# Patient Record
Sex: FEMALE | Race: WHITE
[De-identification: names, ages, dates, MRNs, and addresses within clinical notes are randomized per-mention and may not be internally consistent; named-entity substitution may affect disease eponyms.]

---

## 2017-12-19 ENCOUNTER — HOSPITAL ENCOUNTER (OUTPATIENT)
Dept: HOSPITAL 47 - LABWHC1 | Age: 22
Discharge: HOME | End: 2017-12-19
Payer: COMMERCIAL

## 2017-12-19 DIAGNOSIS — Z3A.00: ICD-10-CM

## 2017-12-19 DIAGNOSIS — O26.813: Primary | ICD-10-CM

## 2017-12-19 LAB
CH: 30.1
CHCM: 34
ERYTHROCYTE [DISTWIDTH] IN BLOOD BY AUTOMATED COUNT: 3.74 M/UL (ref 3.8–5.4)
ERYTHROCYTE [DISTWIDTH] IN BLOOD: 14.1 % (ref 11.5–15.5)
HCT VFR BLD AUTO: 33.3 % (ref 34–46)
HDW: 2.61
HGB BLD-MCNC: 11.3 GM/DL (ref 11.4–16)
MCH RBC QN AUTO: 30.1 PG (ref 25–35)
MCHC RBC AUTO-ENTMCNC: 33.8 G/DL (ref 31–37)
MCV RBC AUTO: 89 FL (ref 80–100)
NON-AFRICAN AMERICAN GFR(MDRD): >60
WBC # BLD AUTO: 12 K/UL (ref 3.8–10.6)

## 2017-12-19 PROCEDURE — 86901 BLOOD TYPING SEROLOGIC RH(D): CPT

## 2017-12-19 PROCEDURE — 82950 GLUCOSE TEST: CPT

## 2017-12-19 PROCEDURE — 85027 COMPLETE CBC AUTOMATED: CPT

## 2017-12-19 PROCEDURE — 86780 TREPONEMA PALLIDUM: CPT

## 2017-12-19 PROCEDURE — 86762 RUBELLA ANTIBODY: CPT

## 2017-12-19 PROCEDURE — 87390 HIV-1 AG IA: CPT

## 2017-12-19 PROCEDURE — 86900 BLOOD TYPING SEROLOGIC ABO: CPT

## 2017-12-19 PROCEDURE — 36415 COLL VENOUS BLD VENIPUNCTURE: CPT

## 2017-12-19 PROCEDURE — 86850 RBC ANTIBODY SCREEN: CPT

## 2017-12-19 PROCEDURE — 87340 HEPATITIS B SURFACE AG IA: CPT

## 2017-12-19 PROCEDURE — 82565 ASSAY OF CREATININE: CPT

## 2018-01-24 NOTE — P.HPOB
History of Present Illness


H&P Date: 18


Chief Complaint: Induction of labor





This is a 22-year-old female  6 para 3 with an estimated date of 

confinement of 2018, estimated gestational age of 40-4/7 weeks, who 

presents to labor and delivery for induction of labor.  She admits to good 

fetal movement.  She has been feeling irregular contractions.  Prenatal course 

was complicated by a gap in prenatal care from 26 weeks to 34 weeks.  She did 

not start prenatal care with me until approximately 21 weeks.  In addition the 

patient stated that she did do her labs at [facility] however they 

have no record of her doing them there.  I finally had her do them in our 

office so that we could get them done at approximately 35 weeks.  I was 

notified by the labor and delivery unit clerk today that the [facility] department called and said that she has been doing crystal meth.  

 has been notified and a drug screen will be obtained on 

admission.





Prenatal labs:


Group B streptococcus-negative


Hepatitis B surface antigen-negative on reactive


HIV-nonreactive


Rubella-immune


Syphilis antibody-negative nonreactive


1 hour Glucola-145


Hemoglobin-11.3


Blood type-A+


Antibody screen-negative


Three-hour Glucola-within normal limits


GC/chlamydia-negative





Obstetrical history: .  History of 2 terminations of pregnancy.  History 

of 3 vaginal deliveries.


Gynecologic history: No history of sexual transmitted diseases


Social history: She is single.  She is not currently working.





Review of Systems


Constitutional: Reports as per HPI, Denies chills, Denies fever


Eyes: denies blurred vision, denies pain


Ears, nose, mouth and throat: Denies headache, Denies sore throat


Cardiovascular: Denies chest pain, Denies shortness of breath


Respiratory: Denies cough


Gastrointestinal: Reports abdominal pain (Irregular contractions)


Genitourinary: Reports pelvic pain, Reports pregnant


Musculoskeletal: Reports low back pain


Integumentary: Denies pruritus, Denies rash


Neurological: Denies numbness, Denies weakness


Psychiatric: Reports anxiety, Reports depression





Past Medical History


Additional Past Medical History / Comment(s): Kidney stones, gallstones


History of Any Multi-Drug Resistant Organisms: None Reported


Past Surgical History: Cholecystectomy


Additional Past Surgical History / Comment(s): Lithotripsy


Past Anesthesia/Blood Transfusion Reactions: No Reported Reaction


Past Psychological History: No Psychological Hx Reported


Smoking Status: Current some day smoker


Past Alcohol Use History: Occasional


Past Drug Use History: None Reported


Additional Drug Use History / Comment(s): smokes half pack cigarettes per day; 

Per [facility]'s Department, she has been using Crystal Meth.





- Past Family History


  ** Sister(s)


Family Medical History: No Reported History


Additional Family Medical History / Comment(s): Heart murmur and heart valve 

stenosis of the mitral valve





Medications and Allergies


 Home Medications











 Medication  Instructions  Recorded  Confirmed  Type


 


Amoxic-Pot Clav 500-125 mg 1 tab PO Q12HR #10 tab 16  Rx





[Augmentin 500-125 mg]    


 


HYDROcodone/APAP 7.5-325MG [Norco 1 tab PO Q4H PRN #30 tab 16  Rx





7.5-325]    











 Allergies











Allergy/AdvReac Type Severity Reaction Status Date / Time


 


codeine Allergy  Anaphylaxis Verified 16 23:52














Exam


Osteopathic Statement: *.  No significant issues noted on an osteopathic 

structural exam other than those noted in the History and Physical/Consult.





Assessment and Plan


(1) 40 weeks gestation of pregnancy


Status: Acute   Code(s): Z3A.40 - 40 WEEKS GESTATION OF PREGNANCY   SNOMED Code(

s): 42997486


   


Plan: 





Proceed with oxytocin induction of labor.  Expectant management.  Epidural 

anesthesia if desired.  We will obtain drug screen on admission and will 

consult .

## 2018-01-25 ENCOUNTER — HOSPITAL ENCOUNTER (INPATIENT)
Dept: HOSPITAL 47 - 4FBP | Age: 23
LOS: 1 days | Discharge: HOME | End: 2018-01-26
Payer: COMMERCIAL

## 2018-01-25 DIAGNOSIS — Z88.5: ICD-10-CM

## 2018-01-25 DIAGNOSIS — O48.0: Primary | ICD-10-CM

## 2018-01-25 DIAGNOSIS — F17.200: ICD-10-CM

## 2018-01-25 DIAGNOSIS — Z3A.40: ICD-10-CM

## 2018-01-25 LAB
BASOPHILS # BLD AUTO: 0.1 K/UL (ref 0–0.2)
BASOPHILS NFR BLD AUTO: 1 %
EOSINOPHIL # BLD AUTO: 0.1 K/UL (ref 0–0.7)
EOSINOPHIL NFR BLD AUTO: 1 %
ERYTHROCYTE [DISTWIDTH] IN BLOOD BY AUTOMATED COUNT: 3.88 M/UL (ref 3.8–5.4)
ERYTHROCYTE [DISTWIDTH] IN BLOOD: 14.8 % (ref 11.5–15.5)
HCT VFR BLD AUTO: 34.1 % (ref 34–46)
HGB BLD-MCNC: 11.7 GM/DL (ref 11.4–16)
LYMPHOCYTES # SPEC AUTO: 1.6 K/UL (ref 1–4.8)
LYMPHOCYTES NFR SPEC AUTO: 14 %
MCH RBC QN AUTO: 30 PG (ref 25–35)
MCHC RBC AUTO-ENTMCNC: 34.2 G/DL (ref 31–37)
MCV RBC AUTO: 87.8 FL (ref 80–100)
MONOCYTES # BLD AUTO: 0.7 K/UL (ref 0–1)
MONOCYTES NFR BLD AUTO: 6 %
NEUTROPHILS # BLD AUTO: 8.4 K/UL (ref 1.3–7.7)
NEUTROPHILS NFR BLD AUTO: 76 %
PLATELET # BLD AUTO: 210 K/UL (ref 150–450)
WBC # BLD AUTO: 11 K/UL (ref 3.8–10.6)

## 2018-01-25 PROCEDURE — 88307 TISSUE EXAM BY PATHOLOGIST: CPT

## 2018-01-25 PROCEDURE — 10907ZC DRAINAGE OF AMNIOTIC FLUID, THERAPEUTIC FROM PRODUCTS OF CONCEPTION, VIA NATURAL OR ARTIFICIAL OPENING: ICD-10-PCS

## 2018-01-25 PROCEDURE — 3E0R3NZ INTRODUCTION OF ANALGESICS, HYPNOTICS, SEDATIVES INTO SPINAL CANAL, PERCUTANEOUS APPROACH: ICD-10-PCS

## 2018-01-25 PROCEDURE — 00HU33Z INSERTION OF INFUSION DEVICE INTO SPINAL CANAL, PERCUTANEOUS APPROACH: ICD-10-PCS

## 2018-01-25 PROCEDURE — 80306 DRUG TEST PRSMV INSTRMNT: CPT

## 2018-01-25 PROCEDURE — 85025 COMPLETE CBC W/AUTO DIFF WBC: CPT

## 2018-01-25 PROCEDURE — 3E0R3BZ INTRODUCTION OF ANESTHETIC AGENT INTO SPINAL CANAL, PERCUTANEOUS APPROACH: ICD-10-PCS

## 2018-01-25 PROCEDURE — 3E033VJ INTRODUCTION OF OTHER HORMONE INTO PERIPHERAL VEIN, PERCUTANEOUS APPROACH: ICD-10-PCS

## 2018-01-25 RX ADMIN — POTASSIUM CHLORIDE SCH MLS/HR: 14.9 INJECTION, SOLUTION INTRAVENOUS at 12:36

## 2018-01-25 RX ADMIN — IBUPROFEN PRN MG: 600 TABLET ORAL at 19:33

## 2018-01-25 RX ADMIN — DOCUSATE SODIUM AND SENNOSIDES SCH: 50; 8.6 TABLET ORAL at 21:27

## 2018-01-25 RX ADMIN — POTASSIUM CHLORIDE SCH MLS/HR: 14.9 INJECTION, SOLUTION INTRAVENOUS at 12:01

## 2018-01-25 RX ADMIN — BUTORPHANOL TARTRATE PRN MG: 1 INJECTION, SOLUTION INTRAMUSCULAR; INTRAVENOUS at 09:02

## 2018-01-25 RX ADMIN — BUTORPHANOL TARTRATE PRN MG: 1 INJECTION, SOLUTION INTRAMUSCULAR; INTRAVENOUS at 10:59

## 2018-01-25 RX ADMIN — POTASSIUM CHLORIDE SCH MLS/HR: 14.9 INJECTION, SOLUTION INTRAVENOUS at 06:42

## 2018-01-25 NOTE — P.PROBDLV
Vaginal Delivery Note





- .


Vaginal Delivery Note: 





The patient progressed to complete dilation after artificial rupture membranes 

with thin meconium noted and epidural anesthesia.  Once reaching complete 

dilation, she began pushing.  Infant's head came to a crown and then delivered 

across the perineum in a right occiput anterior lie.  Immediately after 

delivery of the head, a large amount of blood clot and port wine colored fluid 

was noted.  Nose and mouth were bulb suctioned at the perineum.  With one 

further push, the remainder the body delivered and was placed on mother's 

abdomen.  Nose and mouth were bulb suctioned again.  Cord was clamped and cut 

and infant was taken to warmer for evaluation.  Nuchal cord times one was also 

reduced around the infant with delivery.  A viable male infant was noted with 

Apgar scores at 8 at 1 minute and 9 at 5 minutes and infant weight of 8 lbs. 0 

oz.  Placenta delivered shortly thereafter, intact, with a three-vessel cord.  

Placenta was inspected and noted to have multiple calcifications and did have a 

slight odor.  No specific areas of abruption were noted on the placenta however 

there were a significant amount of blood clots with delivery.  Inspection of 

the perineum revealed no perineal lacerations.  Estimated blood loss is 

approximately 200 mL's.  Both mother and infant are in stable condition.

## 2018-01-26 VITALS — HEART RATE: 74 BPM | DIASTOLIC BLOOD PRESSURE: 87 MMHG | SYSTOLIC BLOOD PRESSURE: 134 MMHG | RESPIRATION RATE: 17 BRPM

## 2018-01-26 VITALS — TEMPERATURE: 98.2 F

## 2018-01-26 RX ADMIN — ACETAMINOPHEN PRN MG: 325 TABLET, FILM COATED ORAL at 05:54

## 2018-01-26 RX ADMIN — ACETAMINOPHEN PRN MG: 325 TABLET, FILM COATED ORAL at 14:10

## 2018-01-26 RX ADMIN — IBUPROFEN PRN MG: 600 TABLET ORAL at 11:08

## 2018-01-26 RX ADMIN — DOCUSATE SODIUM AND SENNOSIDES SCH EACH: 50; 8.6 TABLET ORAL at 09:08

## 2018-01-26 RX ADMIN — IBUPROFEN PRN MG: 600 TABLET ORAL at 02:39

## 2018-01-26 RX ADMIN — IBUPROFEN PRN MG: 600 TABLET ORAL at 16:58

## 2018-01-26 NOTE — P.DS
Providers


Date of admission: 


18 06:20





Expected date of discharge: 18


Attending physician: 


Sharlene Babin





Primary care physician: 


Stated None








- Discharge Diagnosis(es)


(1) 40 weeks gestation of pregnancy


Current Visit: Yes   Status: Acute   


Hospital Course: 





This is a 22-year-old female  6 para 3 at 40-5/7 weeks who presented for 

induction of labor.  She underwent oxytocin induction of labor and delivered 

vaginally a viable male infant with Apgar scores of 8 at 1 minute and 9 at 5 

minutes and infant weight of 8 lbs. 0 oz.  Nuchal cord times one was noted and 

also a partial abruption was noted with delivery.  Her postpartum course has 

been uncomplicated.  Lochia is decreasing.  Pain is fairly well controlled with 

ibuprofen and Tylenol.  She is breast-feeding.  Vital signs are stable.  

Abdomen is soft with fundus firm and nontender.  Extremities show negative 

Homans.  Impression is status post vaginal delivery postpartum day #1.  Plan is 

to discharge home later today as long as the baby can go home.  She will be 

given a prescription for ibuprofen and a breast pump.  Routine postpartum 

instructions are given.  She is advised to follow up in the office in 6 weeks 

for postpartum check.  She did deny using any crystal meth or any drugs at all 

when I confronted her about the accusation of her using crystal meth throughout 

the pregnancy.  Her urine drug screen was negative.


Procedures: 





Oxytocin induction of labor


Spontaneous vaginal delivery of a viable male infant on 2018


Patient Condition at Discharge: Stable





Plan - Discharge Summary


New Discharge Prescriptions: 


New


   Ibuprofen [Motrin] 600 mg PO Q6HR PRN #60 tab


     PRN Reason: Mild Pain Or Fever >= 100.5


Discharge Medication List





Ibuprofen [Motrin] 600 mg PO Q6HR PRN #60 tab 18 [Rx]








Follow up Appointment(s)/Referral(s): 


Sharlnee Babin DO [Doctor of Osteopathic Medicine] - 6 Weeks


Activity/Diet/Wound Care/Special Instructions: 


Postpartum Instructions





1.  Do not begin any exercise program for 3 weeks.


2.  Do not resume sexual relations for 3 weeks or longer if uncomfortable.


3.  You may take tub baths or showers at any time.


4.  You may use tampons if desired after 3 weeks.


5.  Keep the area of episiotomy (stitches) clean and dry.


6.  If you are not nursing, wear a good fitting, supportive bra during the day 

and limit fluid intake for at least 1 week to prevent breast engorgement.


7.  Call the office, [phone], within the next week to make appointment for 

your 6 week checkup if it has not already been made.


8.  Report any of the following occurrences to the doctor promptly:


     a.  Heavy, excessive bleeding


     b.  Chills, fever


     c.  Burning or frequency of urination


     d.  Pain or redness and breasts if nursing


     e.  Increasing pain or swelling in episiotomy (stitches).











In addition to the above instructions, the following additional should be 

followed:


1.  No heavy lifting or straining (exercising) until after 6 week checkup.


2.  Keep abdominal incision clean and dry: You may wear a dressing if more 

comfortable.


3.  Make office appointment for 10 days after going home or as instructed by 

her doctor.


Discharge Disposition: HOME SELF-CARE